# Patient Record
Sex: FEMALE | Race: WHITE | Employment: OTHER | URBAN - METROPOLITAN AREA
[De-identification: names, ages, dates, MRNs, and addresses within clinical notes are randomized per-mention and may not be internally consistent; named-entity substitution may affect disease eponyms.]

---

## 2017-04-06 ENCOUNTER — APPOINTMENT (OUTPATIENT)
Dept: GENERAL RADIOLOGY | Facility: HOSPITAL | Age: 82
End: 2017-04-06
Attending: EMERGENCY MEDICINE
Payer: MEDICARE

## 2017-04-06 ENCOUNTER — HOSPITAL ENCOUNTER (OUTPATIENT)
Facility: HOSPITAL | Age: 82
Setting detail: OBSERVATION
Discharge: HOME | End: 2017-04-07
Attending: EMERGENCY MEDICINE | Admitting: HOSPITALIST
Payer: MEDICARE

## 2017-04-06 DIAGNOSIS — R07.9 ACUTE CHEST PAIN: Primary | ICD-10-CM

## 2017-04-06 PROCEDURE — 36415 COLL VENOUS BLD VENIPUNCTURE: CPT

## 2017-04-06 PROCEDURE — 99285 EMERGENCY DEPT VISIT HI MDM: CPT

## 2017-04-06 PROCEDURE — 93010 ELECTROCARDIOGRAM REPORT: CPT

## 2017-04-06 PROCEDURE — 71020 XR CHEST PA + LAT CHEST (CPT=71020): CPT

## 2017-04-06 PROCEDURE — 84484 ASSAY OF TROPONIN QUANT: CPT

## 2017-04-06 PROCEDURE — 85025 COMPLETE CBC W/AUTO DIFF WBC: CPT

## 2017-04-06 PROCEDURE — 80053 COMPREHEN METABOLIC PANEL: CPT

## 2017-04-06 PROCEDURE — 93005 ELECTROCARDIOGRAM TRACING: CPT

## 2017-04-07 ENCOUNTER — APPOINTMENT (OUTPATIENT)
Dept: CV DIAGNOSTICS | Facility: HOSPITAL | Age: 82
End: 2017-04-07
Attending: INTERNAL MEDICINE
Payer: MEDICARE

## 2017-04-07 VITALS
DIASTOLIC BLOOD PRESSURE: 73 MMHG | OXYGEN SATURATION: 96 % | WEIGHT: 194 LBS | SYSTOLIC BLOOD PRESSURE: 133 MMHG | TEMPERATURE: 98 F | HEART RATE: 80 BPM | BODY MASS INDEX: 34.37 KG/M2 | HEIGHT: 63 IN | RESPIRATION RATE: 20 BRPM

## 2017-04-07 PROCEDURE — 93018 CV STRESS TEST I&R ONLY: CPT | Performed by: INTERNAL MEDICINE

## 2017-04-07 PROCEDURE — 96372 THER/PROPH/DIAG INJ SC/IM: CPT

## 2017-04-07 PROCEDURE — 85025 COMPLETE CBC W/AUTO DIFF WBC: CPT | Performed by: HOSPITALIST

## 2017-04-07 PROCEDURE — 93306 TTE W/DOPPLER COMPLETE: CPT | Performed by: INTERNAL MEDICINE

## 2017-04-07 PROCEDURE — 83735 ASSAY OF MAGNESIUM: CPT | Performed by: HOSPITALIST

## 2017-04-07 PROCEDURE — 78452 HT MUSCLE IMAGE SPECT MULT: CPT

## 2017-04-07 PROCEDURE — 84484 ASSAY OF TROPONIN QUANT: CPT | Performed by: HOSPITALIST

## 2017-04-07 PROCEDURE — 93017 CV STRESS TEST TRACING ONLY: CPT

## 2017-04-07 PROCEDURE — 80048 BASIC METABOLIC PNL TOTAL CA: CPT | Performed by: HOSPITALIST

## 2017-04-07 PROCEDURE — 93306 TTE W/DOPPLER COMPLETE: CPT

## 2017-04-07 RX ORDER — LEVOTHYROXINE SODIUM 0.1 MG/1
100 TABLET ORAL
Status: DISCONTINUED | OUTPATIENT
Start: 2017-04-07 | End: 2017-04-07

## 2017-04-07 RX ORDER — METOPROLOL SUCCINATE 25 MG/1
25 TABLET, EXTENDED RELEASE ORAL DAILY
Qty: 90 TABLET | Refills: 3 | Status: SHIPPED | OUTPATIENT
Start: 2017-04-07 | End: 2018-04-05

## 2017-04-07 RX ORDER — ONDANSETRON 2 MG/ML
4 INJECTION INTRAMUSCULAR; INTRAVENOUS EVERY 6 HOURS PRN
Status: DISCONTINUED | OUTPATIENT
Start: 2017-04-07 | End: 2017-04-07

## 2017-04-07 RX ORDER — POTASSIUM CHLORIDE 20 MEQ/1
40 TABLET, EXTENDED RELEASE ORAL ONCE
Status: COMPLETED | OUTPATIENT
Start: 2017-04-07 | End: 2017-04-07

## 2017-04-07 RX ORDER — HEPARIN SODIUM 5000 [USP'U]/ML
5000 INJECTION, SOLUTION INTRAVENOUS; SUBCUTANEOUS EVERY 8 HOURS
Status: DISCONTINUED | OUTPATIENT
Start: 2017-04-07 | End: 2017-04-07

## 2017-04-07 RX ORDER — POLYETHYLENE GLYCOL 3350 17 G/17G
17 POWDER, FOR SOLUTION ORAL DAILY PRN
Status: DISCONTINUED | OUTPATIENT
Start: 2017-04-07 | End: 2017-04-07

## 2017-04-07 RX ORDER — ASPIRIN 81 MG/1
81 TABLET, CHEWABLE ORAL DAILY
Status: DISCONTINUED | OUTPATIENT
Start: 2017-04-07 | End: 2017-04-07

## 2017-04-07 RX ORDER — ACETAMINOPHEN 325 MG/1
650 TABLET ORAL EVERY 6 HOURS PRN
Status: DISCONTINUED | OUTPATIENT
Start: 2017-04-07 | End: 2017-04-07

## 2017-04-07 NOTE — PROGRESS NOTES
PRELIMINARY CARDIODIAGNOSTICS REPORT    No significant ST changes with exercise noted. Pt denied symptoms. Freq PVCs, with vent bigeminy during exercise, rare 3 beat multifocal vent triplets and rare PACs.   Pt walked for 6:00 on a modified Kalen protocol

## 2017-04-07 NOTE — PROGRESS NOTES
Pt's nuc mildly abnormal, pt with atypical symptoms. I spoke to Dr. Miley Wright. Will continue ASA 81mg daily, start toprol xl 25mg daily & she will f/u in the office next week.    Future Appointments  Date Time Provider Dorothea Clement   4/13/2017 8:10

## 2017-04-07 NOTE — H&P
[facility] History and Physical      Patient presents with:  Dizziness (neurologic)       PCP: Zoe Langley MD      History of Present Illness: Patient is a 81year old female with PMH sig for PAO, COPD presents for eval of cp.       Intermittent ti Hypertension Brother    • Lipids Brother        Review of Systems  Comprehensive ROS reviewed and negative except for what is stated in HPI.       OBJECTIVE:  /82 mmHg  Pulse 71  Temp(Src) 97.8 °F (36.6 °C) (Oral)  Resp 20  Ht 5' 3\" (1.6 m)  Wt 194 l COMPARISON:  [facility] , CHEST PA   LATERAL, 5/06/2012, 15:42. TECHNIQUE:  PA and lateral chest radiographs were obtained. PATIENT STATED HISTORY: (As transcribed by Technologist)  Patient sts woke up this am and didn't feel right.  sts worked out today  and Department of Radiology will inform the patient of their results by letter as well as contact the patient for needed follow-up studies. Hung Krause MD        Assessment/Plan:     Chest pain  - plan stress today and home if neg.   Serial

## 2017-04-07 NOTE — PHYSICAL THERAPY NOTE
PT eval orders recd. Pt demonstrating ind with all mobility at this time and states she has no mobility concerns. rn aware. Will dc PT services.

## 2017-04-07 NOTE — ED PROVIDER NOTES
Patient Seen in: [facility] Emergency Department    History   Patient presents with:  Dizziness (neurologic)    Stated Complaint: dizziness    HPI    71-year-old female coming in with some dizziness nonspecific throughout the day.   Denies vertigo de MG Oral Tab,  Take 1 Tab by mouth daily as needed. Take 180 mg by mouth daily as needed.        Family History   Problem Relation Age of Onset   • Diabetes Mother    • Hypertension Mother    • Lipids Mother    • Cancer Mother      breast ca   • Breast Cance There is no tenderness. Musculoskeletal: Normal range of motion. She exhibits no tenderness. Neurological: She is alert and oriented to person, place, and time. No cranial nerve deficit. She exhibits normal muscle tone.  Coordination normal.   Skin: Ski Abnormal            Final result                 Please view results for these tests on the individual orders.    RAINBOW DRAW BLUE   RAINBOW DRAW GOLD   RAINBOW DRAW LAVENDER   RAINBOW DRAW LIGHT GREEN     Xr Chest Pa + Lat Chest (cpt=71020)    4/

## 2017-04-07 NOTE — CONSULTS
[facility] Cardiology Consultation    Gali López Patient Status:  Observation    3/12/1935 MRN [id number]   [facility] 8NE-A Attending Lars aWite MD   Hosp Day # 1 PCP Giovanni Freitas MD     Reason for Consultation:  Chest pain Mother      breast ca   • Breast Cancer Mother      diagnosed in her [de-identified]   • Cancer Sister      breast ca 52's   • Breast Cancer Sister      diagnosed in her 52's   • Cancer Sister      uterine ca   • Other Sister      thr   • Heart Disorder Brother 187.0  182. 0       Chem:  Recent Labs   Lab  04/06/17 2108  04/07/17   0431   NA  139  141   K  4.1  3.8   CL  105  106   CO2  27.0  31.0   BUN  14  13   CREATSERUM  0.89  0.75   MG   --   2.2   ALT  18   --    AST  18   --    ALB  3.6   --        No res

## 2017-04-07 NOTE — ED INITIAL ASSESSMENT (HPI)
Patient sts woke up this am and didn't feel right. sts worked out today and felt light headed/dizzy. Denies CP, n/v/d.  Patient was seen at immediate care and sent here for further eval.

## 2017-04-08 NOTE — PLAN OF CARE
D/c home per cards  Pt and VS appear stable at time of this note.   Denies CP/SOB   AVS and scripts given verbalized understanding of d/c   Spouse at bedside  Of floor via transport

## 2017-04-17 RX ORDER — FLUTICASONE PROPIONATE 50 MCG
2 SPRAY, SUSPENSION (ML) NASAL
COMMUNITY
End: 2018-05-07

## 2017-04-17 RX ORDER — LEVOTHYROXINE SODIUM 0.1 MG/1
100 TABLET ORAL
COMMUNITY
End: 2017-08-27

## 2017-04-17 RX ORDER — AMOXICILLIN 500 MG/1
2000 TABLET, FILM COATED ORAL SEE ADMIN INSTRUCTIONS
COMMUNITY
End: 2018-05-07

## 2017-04-17 RX ORDER — BUPRENORPHINE HCL 8 MG/1
1 TABLET SUBLINGUAL DAILY
COMMUNITY

## 2017-04-18 ENCOUNTER — HOSPITAL ENCOUNTER (OUTPATIENT)
Dept: INTERVENTIONAL RADIOLOGY/VASCULAR | Facility: HOSPITAL | Age: 82
Discharge: HOME | End: 2017-04-18
Attending: INTERNAL MEDICINE | Admitting: INTERNAL MEDICINE
Payer: MEDICARE

## 2017-04-18 VITALS
SYSTOLIC BLOOD PRESSURE: 127 MMHG | HEART RATE: 68 BPM | WEIGHT: 190 LBS | BODY MASS INDEX: 33.66 KG/M2 | HEIGHT: 63 IN | RESPIRATION RATE: 12 BRPM | OXYGEN SATURATION: 99 % | DIASTOLIC BLOOD PRESSURE: 61 MMHG

## 2017-04-18 DIAGNOSIS — R94.39 ABNORMAL FINDING ON CARDIOVASCULAR STRESS TEST: ICD-10-CM

## 2017-04-18 PROCEDURE — 75625 CONTRAST EXAM ABDOMINL AORTA: CPT

## 2017-04-18 PROCEDURE — 99152 MOD SED SAME PHYS/QHP 5/>YRS: CPT

## 2017-04-18 PROCEDURE — B2151ZZ FLUOROSCOPY OF LEFT HEART USING LOW OSMOLAR CONTRAST: ICD-10-PCS | Performed by: INTERNAL MEDICINE

## 2017-04-18 PROCEDURE — 93458 L HRT ARTERY/VENTRICLE ANGIO: CPT

## 2017-04-18 PROCEDURE — B4101ZZ FLUOROSCOPY OF ABDOMINAL AORTA USING LOW OSMOLAR CONTRAST: ICD-10-PCS | Performed by: INTERNAL MEDICINE

## 2017-04-18 PROCEDURE — 99153 MOD SED SAME PHYS/QHP EA: CPT

## 2017-04-18 PROCEDURE — B2111ZZ FLUOROSCOPY OF MULTIPLE CORONARY ARTERIES USING LOW OSMOLAR CONTRAST: ICD-10-PCS | Performed by: INTERNAL MEDICINE

## 2017-04-18 PROCEDURE — 4A023N7 MEASUREMENT OF CARDIAC SAMPLING AND PRESSURE, LEFT HEART, PERCUTANEOUS APPROACH: ICD-10-PCS | Performed by: INTERNAL MEDICINE

## 2017-04-18 RX ORDER — ASPIRIN 81 MG/1
324 TABLET, CHEWABLE ORAL ONCE
Status: DISCONTINUED | OUTPATIENT
Start: 2017-04-18 | End: 2017-04-18

## 2017-04-18 RX ORDER — LIDOCAINE HYDROCHLORIDE 10 MG/ML
INJECTION, SOLUTION INFILTRATION; PERINEURAL
Status: COMPLETED
Start: 2017-04-18 | End: 2017-04-18

## 2017-04-18 RX ORDER — HEPARIN SODIUM 5000 [USP'U]/ML
INJECTION, SOLUTION INTRAVENOUS; SUBCUTANEOUS
Status: COMPLETED
Start: 2017-04-18 | End: 2017-04-18

## 2017-04-18 RX ORDER — MIDAZOLAM HYDROCHLORIDE 1 MG/ML
INJECTION INTRAMUSCULAR; INTRAVENOUS
Status: COMPLETED
Start: 2017-04-18 | End: 2017-04-18

## 2017-04-18 RX ORDER — SODIUM CHLORIDE 9 MG/ML
INJECTION, SOLUTION INTRAVENOUS CONTINUOUS
Status: DISCONTINUED | OUTPATIENT
Start: 2017-04-18 | End: 2017-04-18

## 2017-04-18 RX ADMIN — SODIUM CHLORIDE: 9 INJECTION, SOLUTION INTRAVENOUS at 07:15:00

## 2017-04-18 NOTE — PROCEDURES
[facility] Cardiology      Procedure:  LHC, CORONARY ANGIO, LV ANGIO,                           PERCLOSE RFA      Findings    LVEF: 70%    LM: NORMAL    LCx: PLAQUE    LAD: PLAQUE    RCA: PLAQUE

## 2017-04-18 NOTE — PROGRESS NOTES
Pt ambulated in [place],rt groin remains c/d/i/soft. Discharge instruction reviewed and no question.  Discharged to home in stable condition

## 2017-04-18 NOTE — PROCEDURES
[facility]    PATIENT'S NAME: Ashlie Barakat   ATTENDING PHYSICIAN: Pamela Obando M.D. OPERATING PHYSICIAN: Pamela Obando M.D.    PATIENT ACCOUNT#:   [de-identified]    LOCATION:  [facility] 4 EDWP 10  MEDICAL RECORD #:   [id number] function. Ejection fraction is 70%. There were no segmental wall motion defects.     CORONARY ANGIOGRAPHY:  The left main segment of the left coronary artery is normal.  Left circumflex artery is a large vessel which gives rise to a large obtuse marginal

## 2017-11-01 PROBLEM — R07.9 ACUTE CHEST PAIN: Status: RESOLVED | Noted: 2017-04-06 | Resolved: 2017-11-01

## 2017-11-10 PROCEDURE — 82570 ASSAY OF URINE CREATININE: CPT | Performed by: INTERNAL MEDICINE

## 2017-11-10 PROCEDURE — 82043 UR ALBUMIN QUANTITATIVE: CPT | Performed by: INTERNAL MEDICINE

## 2018-03-27 PROCEDURE — 88305 TISSUE EXAM BY PATHOLOGIST: CPT | Performed by: RADIOLOGY

## 2018-03-27 PROCEDURE — 88344 IMHCHEM/IMCYTCHM EA MLT ANTB: CPT | Performed by: RADIOLOGY

## 2018-11-08 PROCEDURE — 81001 URINALYSIS AUTO W/SCOPE: CPT | Performed by: INTERNAL MEDICINE

## 2019-04-13 ENCOUNTER — HOSPITAL ENCOUNTER (EMERGENCY)
Facility: HOSPITAL | Age: 84
Discharge: HOME | End: 2019-04-13
Attending: EMERGENCY MEDICINE
Payer: MEDICARE

## 2019-04-13 ENCOUNTER — APPOINTMENT (OUTPATIENT)
Dept: CT IMAGING | Facility: HOSPITAL | Age: 84
End: 2019-04-13
Attending: EMERGENCY MEDICINE
Payer: MEDICARE

## 2019-04-13 ENCOUNTER — APPOINTMENT (OUTPATIENT)
Dept: GENERAL RADIOLOGY | Facility: HOSPITAL | Age: 84
End: 2019-04-13
Attending: EMERGENCY MEDICINE
Payer: MEDICARE

## 2019-04-13 VITALS
RESPIRATION RATE: 16 BRPM | HEART RATE: 72 BPM | TEMPERATURE: 98 F | BODY MASS INDEX: 33.66 KG/M2 | HEIGHT: 63 IN | WEIGHT: 190 LBS | SYSTOLIC BLOOD PRESSURE: 150 MMHG | DIASTOLIC BLOOD PRESSURE: 68 MMHG | OXYGEN SATURATION: 97 %

## 2019-04-13 DIAGNOSIS — R55 SYNCOPE, NEAR: Primary | ICD-10-CM

## 2019-04-13 PROCEDURE — 84484 ASSAY OF TROPONIN QUANT: CPT | Performed by: EMERGENCY MEDICINE

## 2019-04-13 PROCEDURE — 93010 ELECTROCARDIOGRAM REPORT: CPT

## 2019-04-13 PROCEDURE — 96360 HYDRATION IV INFUSION INIT: CPT

## 2019-04-13 PROCEDURE — 96361 HYDRATE IV INFUSION ADD-ON: CPT

## 2019-04-13 PROCEDURE — 99285 EMERGENCY DEPT VISIT HI MDM: CPT

## 2019-04-13 PROCEDURE — 81003 URINALYSIS AUTO W/O SCOPE: CPT | Performed by: EMERGENCY MEDICINE

## 2019-04-13 PROCEDURE — 83735 ASSAY OF MAGNESIUM: CPT | Performed by: EMERGENCY MEDICINE

## 2019-04-13 PROCEDURE — 80053 COMPREHEN METABOLIC PANEL: CPT | Performed by: EMERGENCY MEDICINE

## 2019-04-13 PROCEDURE — 70450 CT HEAD/BRAIN W/O DYE: CPT | Performed by: EMERGENCY MEDICINE

## 2019-04-13 PROCEDURE — 71045 X-RAY EXAM CHEST 1 VIEW: CPT | Performed by: EMERGENCY MEDICINE

## 2019-04-13 PROCEDURE — 93005 ELECTROCARDIOGRAM TRACING: CPT

## 2019-04-13 PROCEDURE — 85025 COMPLETE CBC W/AUTO DIFF WBC: CPT | Performed by: EMERGENCY MEDICINE

## 2019-04-13 RX ORDER — SODIUM CHLORIDE 9 MG/ML
INJECTION, SOLUTION INTRAVENOUS CONTINUOUS
Status: DISCONTINUED | OUTPATIENT
Start: 2019-04-13 | End: 2019-04-13

## 2019-04-13 NOTE — ED PROVIDER NOTES
Patient Seen in: [facility] Emergency Department    History   Patient presents with:  Dizziness (neurologic)    Stated Complaint: dizzy    HPI    Patient is a pleasant 81-year-old female, presenting for evaluation of lightheadedness and near syncope month    Drug use: No      Review of Systems    Positive for stated complaint: dizzy  Other systems are as noted in HPI. Constitutional and vital signs reviewed. All other systems reviewed and negative except as noted above.     Physical Exam     ED T ------                     CBC W/ DIFFERENTIAL[[id number]]                              Final result                 Please view results for these tests on the individual orders.    URINALYSIS WITH CULTURE REFLEX   RAINBOW DRAW BLUE   RAINBOW DRAW Wolfgang Garces COMPARISON:  [facility] , XR CHEST PA + LAT CHEST (COW=[id number]), 4/06/2017, 21:25. [facility] , CHEST PA   LATERAL, 5/06/2012, 15:42. INDICATIONS:  dizzy  PATIENT STATED HISTORY: (As transcribed by Technologist)  Patient feeling dizzy this morning.     FINDINGS:  [facility]    Schedule an appointment as soon as possible for a visit in 3 days  As needed        Medications Prescribed:  Current Discharge Medication List

## 2020-01-07 PROBLEM — I77.1 STRICTURE OF ARTERY (HCC): Status: ACTIVE | Noted: 2020-01-07

## 2020-01-07 PROBLEM — E66.09 CLASS 2 OBESITY DUE TO EXCESS CALORIES WITHOUT SERIOUS COMORBIDITY WITH BODY MASS INDEX (BMI) OF 36.0 TO 36.9 IN ADULT: Status: ACTIVE | Noted: 2020-01-07

## 2020-01-07 PROBLEM — Z85.3 HISTORY OF RIGHT BREAST CANCER: Status: ACTIVE | Noted: 2020-01-07

## 2020-02-23 ENCOUNTER — HOSPITAL ENCOUNTER (EMERGENCY)
Facility: HOSPITAL | Age: 85
Discharge: HOME | End: 2020-02-23
Attending: EMERGENCY MEDICINE
Payer: MEDICARE

## 2020-02-23 ENCOUNTER — APPOINTMENT (OUTPATIENT)
Dept: GENERAL RADIOLOGY | Facility: HOSPITAL | Age: 85
End: 2020-02-23
Attending: EMERGENCY MEDICINE
Payer: MEDICARE

## 2020-02-23 ENCOUNTER — HOSPITAL ENCOUNTER (OUTPATIENT)
Age: 85
Discharge: ANOTHER HOSPITAL | End: 2020-02-23
Payer: MEDICARE

## 2020-02-23 ENCOUNTER — APPOINTMENT (OUTPATIENT)
Dept: CT IMAGING | Facility: HOSPITAL | Age: 85
End: 2020-02-23
Attending: EMERGENCY MEDICINE
Payer: MEDICARE

## 2020-02-23 VITALS
BODY MASS INDEX: 33.66 KG/M2 | HEIGHT: 63 IN | OXYGEN SATURATION: 97 % | SYSTOLIC BLOOD PRESSURE: 154 MMHG | WEIGHT: 190 LBS | RESPIRATION RATE: 17 BRPM | DIASTOLIC BLOOD PRESSURE: 82 MMHG | HEART RATE: 76 BPM | TEMPERATURE: 98 F

## 2020-02-23 VITALS
OXYGEN SATURATION: 99 % | RESPIRATION RATE: 20 BRPM | TEMPERATURE: 98 F | SYSTOLIC BLOOD PRESSURE: 153 MMHG | DIASTOLIC BLOOD PRESSURE: 90 MMHG | HEART RATE: 73 BPM

## 2020-02-23 DIAGNOSIS — S80.02XA CONTUSION OF LEFT KNEE, INITIAL ENCOUNTER: ICD-10-CM

## 2020-02-23 DIAGNOSIS — S00.33XA CONTUSION OF NOSE, INITIAL ENCOUNTER: ICD-10-CM

## 2020-02-23 DIAGNOSIS — S09.90XA CLOSED HEAD INJURY, INITIAL ENCOUNTER: Primary | ICD-10-CM

## 2020-02-23 DIAGNOSIS — S00.83XA CONTUSION OF FACE, INITIAL ENCOUNTER: Primary | ICD-10-CM

## 2020-02-23 PROCEDURE — 99284 EMERGENCY DEPT VISIT MOD MDM: CPT

## 2020-02-23 PROCEDURE — 90471 IMMUNIZATION ADMIN: CPT

## 2020-02-23 PROCEDURE — 70450 CT HEAD/BRAIN W/O DYE: CPT | Performed by: EMERGENCY MEDICINE

## 2020-02-23 PROCEDURE — 73562 X-RAY EXAM OF KNEE 3: CPT | Performed by: EMERGENCY MEDICINE

## 2020-02-23 PROCEDURE — 99213 OFFICE O/P EST LOW 20 MIN: CPT

## 2020-02-23 NOTE — ED NOTES
Patient refusing tylenol at this time. Ice pack applied to left knee, patient refusing ice pack for face.

## 2020-02-23 NOTE — ED PROVIDER NOTES
Patient Seen in: [facility] Emergency Department      History   Patient presents with:  Head Neck Injury    Stated Complaint: sent for ct from [address] [place] [address], fell earlier, on asa    HPI    16-year-old white female who presents to the emergency room today for c Smoker      Smokeless tobacco: Never Used    Alcohol use: Yes      Comment: 1-2 wine per month    Drug use: No             Review of Systems    Positive for stated complaint: sent for ct from [facility], fell earlier, on asa  Other systems are as noted in HPI.   Co Mild generalized cortical atrophy. Mild chronic microvascular changes consistent with age. No bleed or mass effect  INTRACRANIAL:  There are no abnormal extraaxial fluid collections. There is no midline shift.   There are no intraparenchymal brain abnorm

## 2020-02-23 NOTE — ED INITIAL ASSESSMENT (HPI)
[facility] hour afo and landed on her face/nose and had a bad nose bleed   She also noted some knee swelling.   She did not lose consciousness, and just has some nose and left knee pain ( had a Knee replacement to that

## 2020-02-23 NOTE — ED PROVIDER NOTES
Patient Seen in: [facility] [address] [place] [address],[address]      History   Patient presents with:  Fall    Stated Complaint: FACIAL INJURY/NOSE, FELL ON PAVEMENT    HPI     CHIEF COMPLAINT: Facial injury, closed head injury, left knee pain status post fall History:   Diagnosis Date   • ALLERGIC RHINITIS    • Breast cancer ([facility] 2015    rt   • Cancer ([facility] 6/18/15    right breast   • COPD (chronic obstructive pulmonary disease) (HCC)    • High cholesterol    • HYPOTHYROIDISM    • OSTEOARTHRITIS    • Osteoarth (Temporal)   Resp 20   SpO2 99%         Physical Exam    Nursing notes and vital signs reviewed       General Appearance: Alert oriented x4 no acute distress,  GCS 15  Eyes: pupils equal and round no injection, extraocular eye movements intact.  No periorbi display  Patient's tetanus was updated          MDM     I discussed with the patient because of her fall, head/facial injury, age and possible atrophy of the brain that she should go over to the [facility] emergency department for a CT of the brain to rule out

## 2020-10-07 PROBLEM — M54.50 ACUTE LEFT-SIDED LOW BACK PAIN WITHOUT SCIATICA: Status: ACTIVE | Noted: 2020-10-07

## 2020-11-20 PROCEDURE — 88305 TISSUE EXAM BY PATHOLOGIST: CPT | Performed by: RADIOLOGY

## 2020-11-23 PROBLEM — R26.89 BALANCE DISORDER: Status: ACTIVE | Noted: 2020-11-23

## 2021-01-28 ENCOUNTER — HOSPITAL ENCOUNTER (EMERGENCY)
Facility: HOSPITAL | Age: 86
Discharge: HOME | End: 2021-01-28
Attending: EMERGENCY MEDICINE
Payer: MEDICARE

## 2021-01-28 ENCOUNTER — APPOINTMENT (OUTPATIENT)
Dept: CT IMAGING | Facility: HOSPITAL | Age: 86
End: 2021-01-28
Attending: EMERGENCY MEDICINE
Payer: MEDICARE

## 2021-01-28 ENCOUNTER — APPOINTMENT (OUTPATIENT)
Dept: GENERAL RADIOLOGY | Facility: HOSPITAL | Age: 86
End: 2021-01-28
Payer: MEDICARE

## 2021-01-28 VITALS
TEMPERATURE: 98 F | RESPIRATION RATE: 16 BRPM | DIASTOLIC BLOOD PRESSURE: 93 MMHG | SYSTOLIC BLOOD PRESSURE: 159 MMHG | OXYGEN SATURATION: 97 % | WEIGHT: 180 LBS | BODY MASS INDEX: 31.89 KG/M2 | HEIGHT: 63 IN | HEART RATE: 68 BPM

## 2021-01-28 DIAGNOSIS — R42 DIZZINESS: Primary | ICD-10-CM

## 2021-01-28 LAB
ALBUMIN SERPL-MCNC: 3.8 G/DL (ref 3.4–5)
ALBUMIN/GLOB SERPL: 1 {RATIO} (ref 1–2)
ALP LIVER SERPL-CCNC: 83 U/L
ALT SERPL-CCNC: 23 U/L
ANION GAP SERPL CALC-SCNC: 2 MMOL/L (ref 0–18)
AST SERPL-CCNC: 39 U/L (ref 15–37)
BASOPHILS # BLD AUTO: 0.04 X10(3) UL (ref 0–0.2)
BASOPHILS NFR BLD AUTO: 0.5 %
BILIRUB SERPL-MCNC: 0.6 MG/DL (ref 0.1–2)
BILIRUB UR QL STRIP.AUTO: NEGATIVE
BUN BLD-MCNC: 20 MG/DL (ref 7–18)
BUN/CREAT SERPL: 20.4 (ref 10–20)
CALCIUM BLD-MCNC: 9.6 MG/DL (ref 8.5–10.1)
CHLORIDE SERPL-SCNC: 107 MMOL/L (ref 98–112)
CLARITY UR REFRACT.AUTO: CLEAR
CO2 SERPL-SCNC: 30 MMOL/L (ref 21–32)
CREAT BLD-MCNC: 0.98 MG/DL
DEPRECATED RDW RBC AUTO: 49 FL (ref 35.1–46.3)
EOSINOPHIL # BLD AUTO: 0.09 X10(3) UL (ref 0–0.7)
EOSINOPHIL NFR BLD AUTO: 1.1 %
ERYTHROCYTE [DISTWIDTH] IN BLOOD BY AUTOMATED COUNT: 14.5 % (ref 11–15)
GLOBULIN PLAS-MCNC: 3.9 G/DL (ref 2.8–4.4)
GLUCOSE BLD-MCNC: 100 MG/DL (ref 70–99)
GLUCOSE BLD-MCNC: 97 MG/DL (ref 70–99)
GLUCOSE UR STRIP.AUTO-MCNC: NEGATIVE MG/DL
HCT VFR BLD AUTO: 43.5 %
HGB BLD-MCNC: 14.1 G/DL
IMM GRANULOCYTES # BLD AUTO: 0.02 X10(3) UL (ref 0–1)
IMM GRANULOCYTES NFR BLD: 0.3 %
KETONES UR STRIP.AUTO-MCNC: NEGATIVE MG/DL
LEUKOCYTE ESTERASE UR QL STRIP.AUTO: NEGATIVE
LYMPHOCYTES # BLD AUTO: 1.62 X10(3) UL (ref 1–4)
LYMPHOCYTES NFR BLD AUTO: 20.5 %
M PROTEIN MFR SERPL ELPH: 7.7 G/DL (ref 6.4–8.2)
MCH RBC QN AUTO: 29.7 PG (ref 26–34)
MCHC RBC AUTO-ENTMCNC: 32.4 G/DL (ref 31–37)
MCV RBC AUTO: 91.8 FL
MONOCYTES # BLD AUTO: 0.73 X10(3) UL (ref 0.1–1)
MONOCYTES NFR BLD AUTO: 9.2 %
NEUTROPHILS # BLD AUTO: 5.42 X10 (3) UL (ref 1.5–7.7)
NEUTROPHILS # BLD AUTO: 5.42 X10(3) UL (ref 1.5–7.7)
NEUTROPHILS NFR BLD AUTO: 68.4 %
NITRITE UR QL STRIP.AUTO: NEGATIVE
OSMOLALITY SERPL CALC.SUM OF ELEC: 291 MOSM/KG (ref 275–295)
PH UR STRIP.AUTO: 5 [PH] (ref 4.5–8)
PLATELET # BLD AUTO: 173 10(3)UL (ref 150–450)
POTASSIUM SERPL-SCNC: 4.6 MMOL/L (ref 3.5–5.1)
PROT UR STRIP.AUTO-MCNC: NEGATIVE MG/DL
RBC # BLD AUTO: 4.74 X10(6)UL
SODIUM SERPL-SCNC: 139 MMOL/L (ref 136–145)
SP GR UR STRIP.AUTO: 1.01 (ref 1–1.03)
TROPONIN I SERPL-MCNC: <0.045 NG/ML (ref ?–0.04)
UROBILINOGEN UR STRIP.AUTO-MCNC: <2 MG/DL
WBC # BLD AUTO: 7.9 X10(3) UL (ref 4–11)

## 2021-01-28 PROCEDURE — 93010 ELECTROCARDIOGRAM REPORT: CPT

## 2021-01-28 PROCEDURE — 99285 EMERGENCY DEPT VISIT HI MDM: CPT

## 2021-01-28 PROCEDURE — 36415 COLL VENOUS BLD VENIPUNCTURE: CPT

## 2021-01-28 PROCEDURE — 82962 GLUCOSE BLOOD TEST: CPT

## 2021-01-28 PROCEDURE — 71045 X-RAY EXAM CHEST 1 VIEW: CPT | Performed by: EMERGENCY MEDICINE

## 2021-01-28 PROCEDURE — 84484 ASSAY OF TROPONIN QUANT: CPT | Performed by: EMERGENCY MEDICINE

## 2021-01-28 PROCEDURE — 93005 ELECTROCARDIOGRAM TRACING: CPT

## 2021-01-28 PROCEDURE — 70450 CT HEAD/BRAIN W/O DYE: CPT | Performed by: EMERGENCY MEDICINE

## 2021-01-28 PROCEDURE — 80053 COMPREHEN METABOLIC PANEL: CPT | Performed by: EMERGENCY MEDICINE

## 2021-01-28 PROCEDURE — 81001 URINALYSIS AUTO W/SCOPE: CPT | Performed by: EMERGENCY MEDICINE

## 2021-01-28 PROCEDURE — 85025 COMPLETE CBC W/AUTO DIFF WBC: CPT | Performed by: EMERGENCY MEDICINE

## 2021-01-29 LAB
ATRIAL RATE: 70 BPM
P AXIS: 63 DEGREES
P-R INTERVAL: 196 MS
Q-T INTERVAL: 392 MS
QRS DURATION: 88 MS
QTC CALCULATION (BEZET): 423 MS
R AXIS: 77 DEGREES
T AXIS: -2 DEGREES
VENTRICULAR RATE: 70 BPM

## 2021-01-29 NOTE — ED PROVIDER NOTES
Patient Seen in: [facility] Emergency Department      History   Patient presents with:  Dizziness  Fatigue    Stated Complaint: dizzy, weakness    HPI/Subjective:   HPI    45-year-old female presents to the emergency department after having an episo COLONOSCOPY     • COLONOSCOPY N/A 2/6/2015    Performed by Ronn Lerma MD at [address]  7/13/12    Right THR Dr Leatha Bird   • Rocky  06/2015    DCIS, ADH   • KNEE REPLACEMENT SURGERY  11/2015    Left knee replac Musculoskeletal: Normal range of motion. General: No tenderness. Lymphadenopathy:      Cervical: No cervical adenopathy. Skin:     General: Skin is warm and dry. Coloration: Skin is not pale.    Neurological:      Mental Status: She is al nonspecific T wave inversion              Ct Brain Or Head (50753)    Result Date: 1/28/2021  PROCEDURE:  CT BRAIN OR HEAD (66511)  COMPARISON:  [facility] , CT, CT BRAIN OR HEAD (20322), 2/23/2020, 5:32 PM.  INDICATIONS:  head pain or injury  TECHNIQUE:  Nonc CONCLUSION:  1. Lungs are clear without acute cardiopulmonary disease. 2. Stable cardiomegaly.     Dictated by (CST): Marni Ayala, DO on 1/28/2021 at 4:28 PM     Finalized by (CST): Marni Ayala DO on 1/28/2021 at 4:29 PM            MDM      Patient

## 2021-11-15 PROBLEM — Z85.3 HISTORY OF RIGHT BREAST CANCER: Status: RESOLVED | Noted: 2020-01-07 | Resolved: 2021-11-15

## 2021-11-15 PROBLEM — M54.50 ACUTE LEFT-SIDED LOW BACK PAIN WITHOUT SCIATICA: Status: RESOLVED | Noted: 2020-10-07 | Resolved: 2021-11-15

## (undated) NOTE — IP AVS SNAPSHOT
[facility] [place] [address] [place], [address] ~ [phone]                Discharge Summary   4/18/2017    Mimi Avalos           Admission Information        Provider Department    4/18/2017 Khushi Gale MD  I Fluticasone Propionate 50 MCG/ACT Susp   Commonly known as:  FLONASE        2 sprays by Each Nare route daily as needed for Rhinitis.       [    ]    [    ]    [    ]    [    ]       Levothyroxine Sodium 100 MCG Tabs   Commonly known as:  SYNTHROID INFLUENZA 11/24/2016, 1/14/2016, 9/16/2014, 11/11/2013, 10/13/2012, 11/3/2010, 9/22/2009, 11/17/2008, 12/4/2007    Influenza Virus Vaccine, Split 10/11/2011    Pneumococcal (Prevnar 13) 3/25/2015    Pneumovax 23 (Lot Mgr) 12/21/2005    TDAP 11/17/2008 - If you have concerns related to behavioral health issues or thoughts of harming yourself, contact [address] [place] [address] at [phone].     - If you don’t have insurance, [facility] Montanez has partnered with Patient [employer]ente Use: Treat infections or suspected infection   Most common side effects:  Allergic reactions, rash, nausea, diarrhea   What to report to your healthcare team: Tolerance of medications, temperature, rash, itching, shortness of breath, chills, nausea, and leeroy What to report to your healthcare team: Changes in thinking, confusion, palpitations           Endocrine Medications     Levothyroxine Sodium 100 MCG Oral Tab         Use: Regulate metabolism and inflammation   Most common side effects:  Dizziness, swellin

## (undated) NOTE — ED AVS SNAPSHOT
[place]   MRN: [id number]    Department:  [facility] Emergency Department   Date of Visit:  4/13/2019           Disclosure     Insurance plans vary and the physician(s) referred by the ER may not be covered by your plan.  Please contact you tell this physician (or your personal doctor if your instructions are to return to your personal doctor) about any new or lasting problems. The primary care or specialist physician will see patients referred from the [facility] Emergency Department.  Damien Natarajan

## (undated) NOTE — IP AVS SNAPSHOT
[facility] [place]  [address] [place], [address] ~ [phone]                Discharge Summary   4/6/2017    Rose Marie Francois           Admission Information        Provider Department    4/6/2017 Justen Gomez MD  8ne-A Levothyroxine Sodium 100 MCG Tabs   Last time this was given:  100 mcg on 4/7/2017  6:34 AM   Commonly known as:  SYNTHROID        TAKE 1 TABLET BY MOUTH ONCE DAILY.     Lidia Lubin                           Vitamin D 1000 units [address]            Feb 06, 2018  9:40 AM   FOLLOW UP with ANAMIKA Toth PULMONARY MEDICINE [facility] 2)    [address]  [address] [address] [address] HgbA1C Glucose BUN Creatinine Calcium Alkaline Phosph AST    -- (04/07/17)  90 (04/07/17)  13 (04/07/17)  0.75 (04/07/17)  9.0 (04/06/17)  80 (04/06/17)  18      Metabolic Lab Results  (Last result in the past 90 days)    ALT Bilirubin,Total Total Protein Call [phone] for help. MyChart is NOT to be used for urgent needs.   For medical emergencies, dial 911.             _____________________________________________________________________________    Medication Side Effects - Medications to be taken at Most common side effects: Headache, nasal irritation, palpitations, increased heart rate, increased blood pressure, allergic reaction, yeast infection of the mouth/tongue   What to report to your healthcare provider: Head or mouth pain, coating on mouth/to

## (undated) NOTE — ED AVS SNAPSHOT
Lizabeth Rachelle   MRN: [id number]    Department:  [facility] Emergency Department   Date of Visit:  2/23/2020           Disclosure     Insurance plans vary and the physician(s) referred by the ER may not be covered by your plan.  Please contact you tell this physician (or your personal doctor if your instructions are to return to your personal doctor) about any new or lasting problems. The primary care or specialist physician will see patients referred from the [facility] Emergency Department.  Rachele Phillip